# Patient Record
Sex: FEMALE | Race: WHITE
[De-identification: names, ages, dates, MRNs, and addresses within clinical notes are randomized per-mention and may not be internally consistent; named-entity substitution may affect disease eponyms.]

---

## 2020-02-10 ENCOUNTER — HOSPITAL ENCOUNTER (EMERGENCY)
Dept: HOSPITAL 25 - ED | Age: 20
Discharge: HOME | End: 2020-02-10
Payer: COMMERCIAL

## 2020-02-10 VITALS — DIASTOLIC BLOOD PRESSURE: 91 MMHG | SYSTOLIC BLOOD PRESSURE: 118 MMHG

## 2020-02-10 DIAGNOSIS — J10.1: Primary | ICD-10-CM

## 2020-02-10 LAB — FLUBV RNA SPEC QL NAA+PROBE: POSITIVE

## 2020-02-10 PROCEDURE — 99282 EMERGENCY DEPT VISIT SF MDM: CPT

## 2020-02-10 NOTE — ED
Influenza-Like Illness





- HPI Summary


HPI Summary: 


This patient is a 19-year-old otherwise healthy female who presents to the ED 

with a low-grade fever, sore throat and congestion as well as cough.  Patient 

states 2 room mates tested positive for the flu this weekend.  She did not 

receive the flu vaccine.  She continues to eat and drink okay, however somewhat 

less.  She has been taking Tylenol over-the-counter without much relief.  

Symptoms began approximately 2 days ago and have remained persistent.  She does 

feel somewhat better today than she did yesterday.  She states she has no cough 

medication at home or other symptom relief.








- History of Current Complaint


Chief Complaint: EDFluSymptoms


Time Seen by Provider: 02/10/20 09:35


Hx Obtained From: Patient


Onset/Duration: Sudden Onset


Severity: Moderate


Associated Signs & Symptoms: Fever, T Max, F/C, Myalgia, Cough


Related Hx: Possible Flu/Infectious Exposure





- Risk Factors


Influenza Risk Factors: Negative





- Allergy/Home Medications


Allergies/Adverse Reactions: 


 Allergies











Allergy/AdvReac Type Severity Reaction Status Date / Time


 


No Known Allergies Allergy   Verified 02/10/20 08:37











Home Medications: 


 Home Medications





Acetaminophen TAB* [Tylenol TAB*] 325 mg PO Q4H PRN 02/10/20 [History Confirmed 

02/10/20]











PMH/Surg Hx/FS Hx/Imm Hx


Previously Healthy: Yes





- Immunization History


Hx Pertussis Vaccination: No


Immunizations Up to Date: Yes


Infectious Disease History: No


Infectious Disease History: 


   Denies: Traveled Outside the [place] in Last 30 Days





- Social History


Occupation: Student


Lives: Dormitory/Roommates


Alcohol Use: None


Substance Use Type: Reports: None


Smoking Status (MU): Never Smoked Tobacco





Review of Systems


Positive: Fever, Chills, Fatigue, Skin Diaphoresis


Positive: Sore Throat.  Negative: Ear Ache


Negative: Palpitations, Chest Pain


Positive: Cough.  Negative: Shortness Of Breath


Genitourinary: Negative


Positive: no symptoms reported, see HPI


Negative: Arthralgia, Myalgia


Skin: Negative


All Other Systems Reviewed And Are Negative: Yes





Physical Exam


Triage Information Reviewed: Yes


Vital Signs On Initial Exam: 


 Initial Vitals











Temp Pulse Resp BP Pulse Ox


 


 102.1 F   126   19   140/96   98 


 


 02/10/20 08:34  02/10/20 08:34  02/10/20 08:34  02/10/20 08:34  02/10/20 08:34











Vital Signs Reviewed: Yes


Appearance: Positive: Ill-Appearing


Skin: Positive: Skin Color Reflects Adequate Perfusion


Head/Face: Positive: Normal Head/Face Inspection


Eyes: Positive: EOMI, REGIS, Conjunctiva Clear


ENT: Positive: Pharynx normal, TMs normal, Uvula midline.  Negative: Pharyngeal 

erythema, Nasal congestion, TM dull, Tonsillar swelling, Tonsillar exudate, 

Hoarse voice, Dental tenderness, Sinus tenderness


Neck: Positive: Supple, No Lymphadenopathy


Respiratory/Lung Sounds: Positive: Clear to Auscultation, Breath Sounds Present


Cardiovascular: Positive: Pulses are Symmetrical in both Upper and Lower 

Extremities, Tachycardia


Musculoskeletal: Positive: Normal, Strength/ROM Intact


Neurological: Positive: Speech Normal


Psychiatric: Positive: Normal, Affect/Mood Appropriate


AVPU Assessment: Alert





Procedures





- Sedation


Patient Received Moderate/Deep Sedation with Procedure: No





Diagnostics





- Vital Signs


 Vital Signs











  Temp Pulse Resp BP Pulse Ox


 


 02/10/20 12:07  98.8 F  114  19  118/91  100


 


 02/10/20 11:47  99.9 F  117  19  144/84  96


 


 02/10/20 08:34  102.1 F  126  19  140/96  98














- Laboratory


Lab Results: 


 Lab Results











  02/10/20 Range/Units





  08:37 


 


Influenza A (Rapid)  Not Reportable  


 


Influenza B (Rapid)  Positive H  (Negative)  











Lab Statement: Any lab studies that have been ordered have been reviewed, and 

results considered in the medical decision making process.





Flu Symptom Course/Dx





- Course


Course Of Treatment: This patient is evaluated for influenza-like symptoms.  On 

arrival to the ED, vital signs are 102.1 and tachycardic at 126.  Patient was 

given PO fluids and tylenol.  This reduced temp to 98.8 and .  Pt 

continues to drink well and does not appear dehydrated.  Influenza B-positive.  

Pt given note for school and dx with influenza.  tamiflu sent to rx.





- Diagnoses


Differential Diagnosis/HQI/PQRI: Positive: Influenza


Provider Diagnoses: 


 Influenza B








Discharge ED





- Sign-Out/Discharge


Documenting (check all that apply): Patient Departure





- Discharge Plan


Condition: Stable


Disposition: HOME


Prescriptions: 


Oseltamivir CAP* [Tamiflu CAP*] 75 mg PO BID #9 cap


Patient Education Materials:  Influenza (ED)


Forms:  *School Release


Referrals: 


[facility] - Marquis GUADARRAMA [Primary Care Provider] - 


Additional Instructions: 


Tamiflu twice daily x 5 days


Out of school 5 days


Drink plenty of fluids


Tylenol 650 mg 4 times daily, ibuprofen 600 mg 4 times daily


Use these intermittently


Rest








- Billing Disposition and Condition


Condition: STABLE


Disposition: Home